# Patient Record
Sex: FEMALE | Race: BLACK OR AFRICAN AMERICAN
[De-identification: names, ages, dates, MRNs, and addresses within clinical notes are randomized per-mention and may not be internally consistent; named-entity substitution may affect disease eponyms.]

---

## 2017-06-25 ENCOUNTER — HOSPITAL ENCOUNTER (EMERGENCY)
Dept: HOSPITAL 17 - NEPD | Age: 27
Discharge: HOME | End: 2017-06-25
Payer: SELF-PAY

## 2017-06-25 VITALS
DIASTOLIC BLOOD PRESSURE: 88 MMHG | SYSTOLIC BLOOD PRESSURE: 142 MMHG | TEMPERATURE: 98.2 F | OXYGEN SATURATION: 98 % | RESPIRATION RATE: 16 BRPM | HEART RATE: 82 BPM

## 2017-06-25 DIAGNOSIS — J30.9: Primary | ICD-10-CM

## 2017-06-25 DIAGNOSIS — J32.0: ICD-10-CM

## 2017-06-25 PROCEDURE — 99283 EMERGENCY DEPT VISIT LOW MDM: CPT
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
## 2018-04-14 ENCOUNTER — HOSPITAL ENCOUNTER (EMERGENCY)
Dept: HOSPITAL 17 - NEPE | Age: 28
LOS: 1 days | Discharge: HOME | End: 2018-04-15
Payer: COMMERCIAL

## 2018-04-14 VITALS
RESPIRATION RATE: 18 BRPM | SYSTOLIC BLOOD PRESSURE: 124 MMHG | DIASTOLIC BLOOD PRESSURE: 81 MMHG | OXYGEN SATURATION: 100 % | TEMPERATURE: 98.7 F | HEART RATE: 92 BPM

## 2018-04-14 VITALS — WEIGHT: 180.78 LBS | HEIGHT: 64 IN | BODY MASS INDEX: 30.86 KG/M2
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
## 2018-04-14 DIAGNOSIS — S00.83XA: Primary | ICD-10-CM
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
## 2018-04-14 DIAGNOSIS — S46.912A: ICD-10-CM

## 2018-04-14 DIAGNOSIS — Z23: ICD-10-CM
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
## 2018-04-14 DIAGNOSIS — S40.812A: ICD-10-CM

## 2018-04-14 DIAGNOSIS — S46.911A: ICD-10-CM

## 2018-04-14 PROCEDURE — 90471 IMMUNIZATION ADMIN: CPT

## 2018-04-14 PROCEDURE — 96372 THER/PROPH/DIAG INJ SC/IM: CPT

## 2018-04-14 PROCEDURE — 99283 EMERGENCY DEPT VISIT LOW MDM: CPT

## 2018-04-14 PROCEDURE — 90714 TD VACC NO PRESV 7 YRS+ IM: CPT
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
